# Patient Record
Sex: MALE | NOT HISPANIC OR LATINO | URBAN - METROPOLITAN AREA
[De-identification: names, ages, dates, MRNs, and addresses within clinical notes are randomized per-mention and may not be internally consistent; named-entity substitution may affect disease eponyms.]

---

## 2023-09-10 PROBLEM — H01.004 BLEPHARITIS OF LEFT UPPER EYELID: Status: ACTIVE | Noted: 2023-09-10

## 2023-09-10 PROBLEM — F31.0 BIPOLAR I DISORDER, MOST RECENT EPISODE HYPOMANIC (MULTI): Status: ACTIVE | Noted: 2023-09-10

## 2023-09-10 PROBLEM — M75.42 SHOULDER IMPINGEMENT SYNDROME, LEFT: Status: ACTIVE | Noted: 2023-09-10

## 2023-09-10 PROBLEM — M99.08 SOMATIC DYSFUNCTION OF RIB: Status: ACTIVE | Noted: 2023-09-10

## 2023-09-10 PROBLEM — M75.22 BICEPS TENDINITIS OF LEFT SHOULDER: Status: ACTIVE | Noted: 2023-09-10

## 2023-09-10 PROBLEM — M75.81 ROTATOR CUFF TENDONITIS, RIGHT: Status: ACTIVE | Noted: 2023-09-10

## 2023-09-10 PROBLEM — H52.7 UNSPECIFIED DISORDER OF REFRACTION: Status: ACTIVE | Noted: 2023-09-10

## 2023-09-10 PROBLEM — R09.89 LABILE BLOOD PRESSURE: Status: ACTIVE | Noted: 2023-09-10

## 2023-09-10 PROBLEM — I71.9 AORTIC ANEURYSM (CMS-HCC): Status: ACTIVE | Noted: 2023-09-10

## 2023-09-10 PROBLEM — M99.00 CRANIAL SOMATIC DYSFUNCTION: Status: ACTIVE | Noted: 2023-09-10

## 2023-09-10 PROBLEM — H25.13 AGE-RELATED NUCLEAR CATARACT OF BOTH EYES: Status: ACTIVE | Noted: 2023-09-10

## 2023-09-10 PROBLEM — M75.21 BICEPS TENDONITIS, RIGHT: Status: ACTIVE | Noted: 2023-09-10

## 2023-09-10 PROBLEM — N40.0 BPH (BENIGN PROSTATIC HYPERPLASIA): Status: ACTIVE | Noted: 2023-09-10

## 2023-09-10 PROBLEM — H01.001 BLEPHARITIS OF RIGHT UPPER EYELID: Status: ACTIVE | Noted: 2023-09-10

## 2023-09-10 PROBLEM — M99.09 SEGMENTAL AND SOMATIC DYSFUNCTION: Status: ACTIVE | Noted: 2023-09-10

## 2023-09-10 PROBLEM — R25.1 TREMOR OF LEFT HAND: Status: ACTIVE | Noted: 2023-09-10

## 2023-09-10 RX ORDER — LITHIUM CARBONATE 300 MG/1
CAPSULE ORAL
COMMUNITY
Start: 2012-09-04

## 2023-09-10 RX ORDER — FINASTERIDE 5 MG/1
TABLET, FILM COATED ORAL
COMMUNITY
Start: 2017-01-23

## 2024-04-23 ENCOUNTER — APPOINTMENT (OUTPATIENT)
Dept: OPHTHALMOLOGY | Facility: CLINIC | Age: 59
End: 2024-04-23

## 2025-01-02 ENCOUNTER — OFFICE VISIT (OUTPATIENT)
Dept: OPHTHALMOLOGY | Facility: CLINIC | Age: 60
End: 2025-01-02
Payer: COMMERCIAL

## 2025-01-02 DIAGNOSIS — Z97.3 WEARS CONTACT LENSES: ICD-10-CM

## 2025-01-02 DIAGNOSIS — H52.7 UNSPECIFIED DISORDER OF REFRACTION: ICD-10-CM

## 2025-01-02 DIAGNOSIS — H25.813 COMBINED FORMS OF AGE-RELATED CATARACT OF BOTH EYES: Primary | ICD-10-CM

## 2025-01-02 DIAGNOSIS — H01.02B SQUAMOUS BLEPHARITIS OF BOTH UPPER AND LOWER EYELID OF LEFT EYE: ICD-10-CM

## 2025-01-02 PROBLEM — H01.001 BLEPHARITIS OF RIGHT UPPER EYELID: Status: RESOLVED | Noted: 2023-09-10 | Resolved: 2025-01-02

## 2025-01-02 PROCEDURE — 99213 OFFICE O/P EST LOW 20 MIN: CPT | Performed by: OPHTHALMOLOGY

## 2025-01-02 RX ORDER — TERBINAFINE HYDROCHLORIDE 250 MG/1
1 TABLET ORAL
COMMUNITY
Start: 2024-05-08

## 2025-01-02 RX ORDER — FLUTICASONE PROPIONATE 50 MCG
2 SPRAY, SUSPENSION (ML) NASAL
COMMUNITY
Start: 2023-10-25

## 2025-01-02 RX ORDER — MINOXIDIL 50 MG/G
AEROSOL, FOAM TOPICAL
COMMUNITY

## 2025-01-02 ASSESSMENT — REFRACTION_MANIFEST
OD_CYLINDER: -1.50
OD_ADD: +2.50
OS_AXIS: 095
OD_SPHERE: -0.50
OS_CYLINDER: -1.25
OD_CYLINDER: -2.25
OS_CYLINDER: -0.75
OS_AXIS: 090
OS_ADD: +2.50
OD_AXIS: 080
OD_AXIS: 090
OS_SPHERE: -1.75
METHOD_AUTOREFRACTION: 1
OD_SPHERE: -0.75
OS_SPHERE: -1.75

## 2025-01-02 ASSESSMENT — SLIT LAMP EXAM - LIDS
COMMENTS: 1+ DERMATOCHALASIS - UPPER LID, 1+ BLEPHARITIS
COMMENTS: 1+ DERMATOCHALASIS - UPPER LID, 1+ BLEPHARITIS

## 2025-01-02 ASSESSMENT — REFRACTION_CURRENTRX
OS_SPHERE: -2.25
OS_SPHERE: -2.50
OS_BASECURVE: 8.6
OS_BASECURVE: 8.6
OS_DIAMETER: 14.2
OS_BRAND: AIR OPTIX HYDRAGLYDE
OS_DIAMETER: 14.2
OS_BRAND: AIR OPTIX HYDRAGLYDE

## 2025-01-02 ASSESSMENT — KERATOMETRY
OD_K1POWER_DIOPTERS: 43.50
OD_K2POWER_DIOPTERS: 45.00
OS_K2POWER_DIOPTERS: 44.75
OD_AXISANGLE2_DEGREES: 80
OS_AXISANGLE_DEGREES: 5
OS_K1POWER_DIOPTERS: 43.75
OS_AXISANGLE2_DEGREES: 95
OD_AXISANGLE_DEGREES: 170

## 2025-01-02 ASSESSMENT — VISUAL ACUITY
OS_CC+: -1
OD_SC: J1
OS_CC: 20/20
METHOD: SNELLEN - SINGLE
CORRECTION_TYPE: CONTACTS

## 2025-01-02 ASSESSMENT — PATIENT HEALTH QUESTIONNAIRE - PHQ9
1. LITTLE INTEREST OR PLEASURE IN DOING THINGS: NOT AT ALL
2. FEELING DOWN, DEPRESSED OR HOPELESS: NOT AT ALL
SUM OF ALL RESPONSES TO PHQ9 QUESTIONS 1 AND 2: 0

## 2025-01-02 ASSESSMENT — ENCOUNTER SYMPTOMS
MUSCULOSKELETAL NEGATIVE: 0
ALLERGIC/IMMUNOLOGIC NEGATIVE: 0
EYES NEGATIVE: 0
CARDIOVASCULAR NEGATIVE: 0
PSYCHIATRIC NEGATIVE: 0
NEUROLOGICAL NEGATIVE: 0
GASTROINTESTINAL NEGATIVE: 0
HEMATOLOGIC/LYMPHATIC NEGATIVE: 0
ENDOCRINE NEGATIVE: 0
CONSTITUTIONAL NEGATIVE: 0
RESPIRATORY NEGATIVE: 0

## 2025-01-02 ASSESSMENT — CUP TO DISC RATIO
OS_RATIO: 0.4
OD_RATIO: 0.4

## 2025-01-02 ASSESSMENT — TONOMETRY
OD_IOP_MMHG: 14
IOP_METHOD: GOLDMANN APPLANATION
OS_IOP_MMHG: 14

## 2025-01-02 ASSESSMENT — EXTERNAL EXAM - LEFT EYE: OS_EXAM: NORMAL

## 2025-01-02 ASSESSMENT — PAIN SCALES - GENERAL: PAINLEVEL_OUTOF10: 0-NO PAIN

## 2025-01-02 ASSESSMENT — EXTERNAL EXAM - RIGHT EYE: OD_EXAM: NORMAL

## 2025-01-02 NOTE — PROGRESS NOTES
Subjective   Patient ID: Alber Olivares is a 59 y.o. male.    Chief Complaint    Annual Exam       HPI    Complete and cl exam.  No new changes in health history or meds.  Vision is good and stable.  Wears cl's daily.  No complaints.    Last edited by Rayshawn Schulz MD on 1/2/2025 12:01 PM.        No current outpatient medications on file. (Ophthalmology pharm classes)       Current Outpatient Medications (Other)   Medication Sig Dispense Refill    finasteride (Proscar) 5 mg tablet Proscar 5 MG Oral Tablet   Quantity: 30  Refills: 0        Start : 23-Jan-2017   Active      fluticasone (Flonase) 50 mcg/actuation nasal spray 2 sprays by Does not apply route once daily.      lithium 300 mg capsule TAKE 1 CAPSULE Daily      minoxidiL 5 % foam Apply topically.      rosuvastatin calcium (ROSUVASTATIN ORAL) Crestor TABS   Refills: 0       Active      terbinafine (LamISIL) 250 mg tablet Take 1 tablet (250 mg) by mouth early in the morning.. (Patient not taking: Reported on 1/2/2025)         Objective   Base Eye Exam       Visual Acuity (Snellen - Single)         Right Left Both    Dist cc  20/20 -1 20/20 -1    Near sc J1  J1      Correction: Contacts   Monovision wearing soft contact lens (SCL) OS only for distance.             Tonometry (Goldmann Applanation, 11:21 AM)         Right Left    Pressure 14 14              Pupils         Dark Shape React APD    Right 4 Round 1 None    Left 4 Round 1 None              Extraocular Movement         Right Left     Full Full              Dilation       Both eyes: 1% Tropic 2.5% Phen @ 11:21 AM                  Additional Tests       Keratometry         K1 Axis K2 Axis    Right 43.50 80 45.00 170    Left 43.75 95 44.75 5                  Slit Lamp and Fundus Exam       External Exam         Right Left    External Normal Normal              Slit Lamp Exam         Right Left    Lids/Lashes 1+ Dermatochalasis - upper lid, 1+ Blepharitis 1+ Dermatochalasis - upper lid, 1+ Blepharitis     Conjunctiva/Sclera White and quiet White and quiet    Cornea Clear Clear    Anterior Chamber Deep and quiet Deep and quiet    Iris Round and reactive Round and reactive    Lens Trace Nuclear sclerosis Trace Nuclear sclerosis    Anterior Vitreous Vitreous syneresis Vitreous syneresis              Fundus Exam         Right Left    Disc Normal Normal    C/D Ratio 0.4 0.4    Macula Normal Normal    Vessels Normal Normal    Periphery Normal Normal                  Refraction       Manifest Refraction (Auto)         Sphere Cylinder Dubberly Dist VA Add Near VA    Right -0.50 -2.25 080 20/20      Left -1.75 -1.25 095 20/20        Pupillary Distance: 62              Manifest Refraction #2 (Subjective)         Sphere Cylinder Dubberly Dist VA Add Near VA    Right -0.75 -1.50 090 20/20 +2.50 J1+    Left -1.75 -0.75 090 20/20 +2.50 J1+      Pupillary Distance: 62              Final Rx         Sphere Cylinder Dubberly Dist VA Add Near VA    Right -0.75 -1.50 090 20/20 +2.50 J1+    Left -1.75 -0.75 090 20/20 +2.50 J1+      Type: progressive    Expiration Date: 1/2/2027    Pupillary Distance: 62                  Contact Lens Exam       Current Contact Lens Rx         Brand Base Curve Diameter Sphere    Right        Left Air Optix HydraGlyde 8.6 14.2 -2.50              Current Contact Lens Rx #2 (Trial Lens)         Brand Base Curve Diameter Sphere    Right        Left Air Optix HydraGlyde 8.6 14.2 -2.25      Comments: OS lot [id number] exp 06/2029  Over-refraction +/- preferred new power.              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  Spec and cl exam.

## 2026-01-06 ENCOUNTER — APPOINTMENT (OUTPATIENT)
Dept: OPHTHALMOLOGY | Facility: CLINIC | Age: 61
End: 2026-01-06
Payer: COMMERCIAL